# Patient Record
Sex: MALE | Race: WHITE | NOT HISPANIC OR LATINO | Employment: FULL TIME | URBAN - METROPOLITAN AREA
[De-identification: names, ages, dates, MRNs, and addresses within clinical notes are randomized per-mention and may not be internally consistent; named-entity substitution may affect disease eponyms.]

---

## 2022-11-16 ENCOUNTER — OFFICE VISIT (OUTPATIENT)
Dept: URGENT CARE | Facility: CLINIC | Age: 27
End: 2022-11-16

## 2022-11-16 VITALS
BODY MASS INDEX: 31.83 KG/M2 | RESPIRATION RATE: 16 BRPM | OXYGEN SATURATION: 97 % | TEMPERATURE: 98.2 F | WEIGHT: 210 LBS | HEART RATE: 114 BPM | HEIGHT: 68 IN

## 2022-11-16 DIAGNOSIS — S61.219A LACERATION WITHOUT FOREIGN BODY OF UNSPECIFIED FINGER WITHOUT DAMAGE TO NAIL, INITIAL ENCOUNTER: Primary | ICD-10-CM

## 2022-11-16 RX ORDER — CEPHALEXIN 500 MG/1
500 CAPSULE ORAL EVERY 12 HOURS SCHEDULED
Qty: 14 CAPSULE | Refills: 0 | Status: SHIPPED | OUTPATIENT
Start: 2022-11-16 | End: 2022-11-23

## 2022-11-17 NOTE — PROGRESS NOTES
[id number]        NAME: Carlie Vincent is a 23 y o  male  : 1995    MRN: [id number]  DATE: 2022  TIME: 7:54 PM    Assessment and Plan   Laceration without foreign body of unspecified finger without damage to nail, initial encounter [S61 [id number]]  1  Laceration without foreign body of unspecified finger without damage to nail, initial encounter  cephalexin (KEFLEX) 500 mg capsule            Patient Instructions   Patient Instructions   Keep the area clean and dry        Follow up with PCP in 3-5 days  Proceed to  ER if symptoms worsen  Chief Complaint     Chief Complaint   Patient presents with   • Laceration     Pt presents left index finger laceration from metal         History of Present Illness       The patient is a 25-year-old male presenting today for laceration to his left index finger from a piece of metal   The injury happened today in the morning  Last Tdap was within the year  Review of Systems   Review of Systems   Musculoskeletal: Negative for joint swelling and myalgias  Skin: Positive for wound  Negative for color change  Current Medications       Current Outpatient Medications:   •  cephalexin (KEFLEX) 500 mg capsule, Take 1 capsule (500 mg total) by mouth every 12 (twelve) hours for 7 days, Disp: 14 capsule, Rfl: 0    Current Allergies     Allergies as of 2022   • (No Known Allergies)            The following portions of the patient's history were reviewed and updated as appropriate: allergies, current medications, past family history, past medical history, past social history, past surgical history and problem list      No past medical history on file  No past surgical history on file  No family history on file  Medications have been verified          Objective   Pulse (!) 114   Temp 98 2 °F (36 8 °C)   Resp 16   Ht 5' 8" (1 727 m)   Wt 95 3 kg (210 lb)   SpO2 97%   BMI 31 93 kg/m²        Physical Exam     Physical Exam  Vitals and nursing note reviewed  Constitutional:       General: He is not in acute distress  Appearance: Normal appearance  He is normal weight  He is not ill-appearing, toxic-appearing or diaphoretic  Cardiovascular:      Rate and Rhythm: Normal rate  Pulmonary:      Effort: Pulmonary effort is normal    Musculoskeletal:      Cervical back: Normal range of motion  Lymphadenopathy:      Cervical: No cervical adenopathy  Skin:     General: Skin is warm and dry  Capillary Refill: Capillary refill takes less than 2 seconds  Comments: Superficial cut to the left index finger  The wound is already healing  Bleeding is well controlled  Two Steri-Strips placed  Neurological:      General: No focal deficit present  Mental Status: He is alert